# Patient Record
Sex: MALE | Race: WHITE | NOT HISPANIC OR LATINO
[De-identification: names, ages, dates, MRNs, and addresses within clinical notes are randomized per-mention and may not be internally consistent; named-entity substitution may affect disease eponyms.]

---

## 2020-01-13 PROBLEM — Z00.00 ENCOUNTER FOR PREVENTIVE HEALTH EXAMINATION: Status: ACTIVE | Noted: 2020-01-13

## 2020-01-23 ENCOUNTER — NON-APPOINTMENT (OUTPATIENT)
Age: 55
End: 2020-01-23

## 2020-01-23 ENCOUNTER — APPOINTMENT (OUTPATIENT)
Dept: CARDIOLOGY | Facility: CLINIC | Age: 55
End: 2020-01-23
Payer: COMMERCIAL

## 2020-01-23 VITALS
OXYGEN SATURATION: 95 % | SYSTOLIC BLOOD PRESSURE: 135 MMHG | DIASTOLIC BLOOD PRESSURE: 87 MMHG | HEIGHT: 75 IN | WEIGHT: 264 LBS | BODY MASS INDEX: 32.83 KG/M2 | HEART RATE: 89 BPM

## 2020-01-23 DIAGNOSIS — R07.9 CHEST PAIN, UNSPECIFIED: ICD-10-CM

## 2020-01-23 DIAGNOSIS — Z86.79 PERSONAL HISTORY OF OTHER DISEASES OF THE CIRCULATORY SYSTEM: ICD-10-CM

## 2020-01-23 DIAGNOSIS — Z83.438 FAMILY HISTORY OF OTHER DISORDER OF LIPOPROTEIN METABOLISM AND OTHER LIPIDEMIA: ICD-10-CM

## 2020-01-23 DIAGNOSIS — Z82.49 FAMILY HISTORY OF ISCHEMIC HEART DISEASE AND OTHER DISEASES OF THE CIRCULATORY SYSTEM: ICD-10-CM

## 2020-01-23 DIAGNOSIS — E66.3 OVERWEIGHT: ICD-10-CM

## 2020-01-23 PROCEDURE — 93000 ELECTROCARDIOGRAM COMPLETE: CPT

## 2020-01-23 PROCEDURE — 99204 OFFICE O/P NEW MOD 45 MIN: CPT

## 2020-01-23 RX ORDER — ROSUVASTATIN CALCIUM 40 MG/1
40 TABLET, FILM COATED ORAL
Refills: 0 | Status: DISCONTINUED | COMMUNITY
End: 2020-01-23

## 2020-01-27 LAB
ALBUMIN SERPL ELPH-MCNC: 4.7 G/DL
ALP BLD-CCNC: 60 U/L
ALT SERPL-CCNC: 42 U/L
ANION GAP SERPL CALC-SCNC: 11 MMOL/L
AST SERPL-CCNC: 21 U/L
BILIRUB SERPL-MCNC: 0.2 MG/DL
BUN SERPL-MCNC: 18 MG/DL
CALCIUM SERPL-MCNC: 9.8 MG/DL
CHLORIDE SERPL-SCNC: 105 MMOL/L
CHOLEST SERPL-MCNC: 203 MG/DL
CHOLEST/HDLC SERPL: 3.3 RATIO
CO2 SERPL-SCNC: 25 MMOL/L
CREAT SERPL-MCNC: 1.05 MG/DL
ESTIMATED AVERAGE GLUCOSE: 120 MG/DL
GLUCOSE SERPL-MCNC: 114 MG/DL
HBA1C MFR BLD HPLC: 5.8 %
HDLC SERPL-MCNC: 61 MG/DL
LDLC SERPL CALC-MCNC: 91 MG/DL
POTASSIUM SERPL-SCNC: 4.7 MMOL/L
PROT SERPL-MCNC: 7 G/DL
SODIUM SERPL-SCNC: 140 MMOL/L
TRIGL SERPL-MCNC: 257 MG/DL
TSH SERPL-ACNC: 0.89 UIU/ML

## 2020-05-13 ENCOUNTER — MESSAGE (OUTPATIENT)
Age: 55
End: 2020-05-13

## 2020-05-13 ENCOUNTER — APPOINTMENT (OUTPATIENT)
Dept: DISASTER EMERGENCY | Facility: HOSPITAL | Age: 55
End: 2020-05-13

## 2020-05-14 LAB
SARS-COV-2 IGG SERPL IA-ACNC: 4.3 INDEX
SARS-COV-2 IGG SERPL QL IA: POSITIVE

## 2021-04-02 ENCOUNTER — APPOINTMENT (OUTPATIENT)
Dept: CARDIOLOGY | Facility: CLINIC | Age: 56
End: 2021-04-02
Payer: COMMERCIAL

## 2021-04-02 ENCOUNTER — NON-APPOINTMENT (OUTPATIENT)
Age: 56
End: 2021-04-02

## 2021-04-02 VITALS
BODY MASS INDEX: 33.07 KG/M2 | DIASTOLIC BLOOD PRESSURE: 96 MMHG | OXYGEN SATURATION: 95 % | HEART RATE: 66 BPM | SYSTOLIC BLOOD PRESSURE: 150 MMHG | HEIGHT: 75 IN | WEIGHT: 266 LBS

## 2021-04-02 VITALS — SYSTOLIC BLOOD PRESSURE: 138 MMHG | DIASTOLIC BLOOD PRESSURE: 80 MMHG

## 2021-04-02 DIAGNOSIS — E78.5 HYPERLIPIDEMIA, UNSPECIFIED: ICD-10-CM

## 2021-04-02 DIAGNOSIS — I25.10 ATHEROSCLEROTIC HEART DISEASE OF NATIVE CORONARY ARTERY W/OUT ANGINA PECTORIS: ICD-10-CM

## 2021-04-02 PROCEDURE — 99214 OFFICE O/P EST MOD 30 MIN: CPT

## 2021-04-02 PROCEDURE — 99072 ADDL SUPL MATRL&STAF TM PHE: CPT

## 2021-04-02 PROCEDURE — 93000 ELECTROCARDIOGRAM COMPLETE: CPT

## 2021-04-02 RX ORDER — ATORVASTATIN CALCIUM 10 MG/1
10 TABLET, FILM COATED ORAL DAILY
Qty: 90 | Refills: 3 | Status: ACTIVE | COMMUNITY
Start: 2021-04-02 | End: 1900-01-01

## 2021-04-02 RX ORDER — ATORVASTATIN CALCIUM 10 MG/1
10 TABLET, FILM COATED ORAL DAILY
Qty: 90 | Refills: 3 | Status: DISCONTINUED | COMMUNITY
Start: 2020-02-13 | End: 2021-04-02

## 2021-05-04 ENCOUNTER — APPOINTMENT (OUTPATIENT)
Dept: CARDIOLOGY | Facility: CLINIC | Age: 56
End: 2021-05-04

## 2021-05-06 ENCOUNTER — APPOINTMENT (OUTPATIENT)
Dept: CARDIOLOGY | Facility: CLINIC | Age: 56
End: 2021-05-06
Payer: COMMERCIAL

## 2021-05-06 PROCEDURE — 93015 CV STRESS TEST SUPVJ I&R: CPT

## 2021-05-06 PROCEDURE — 99072 ADDL SUPL MATRL&STAF TM PHE: CPT

## 2021-05-07 ENCOUNTER — APPOINTMENT (OUTPATIENT)
Dept: CARDIOLOGY | Facility: CLINIC | Age: 56
End: 2021-05-07
Payer: COMMERCIAL

## 2021-05-07 PROCEDURE — 99072 ADDL SUPL MATRL&STAF TM PHE: CPT

## 2021-05-07 PROCEDURE — 93306 TTE W/DOPPLER COMPLETE: CPT

## 2021-05-17 DIAGNOSIS — I65.29 OCCLUSION AND STENOSIS OF UNSPECIFIED CAROTID ARTERY: ICD-10-CM

## 2021-05-20 ENCOUNTER — APPOINTMENT (OUTPATIENT)
Dept: CARDIOLOGY | Facility: CLINIC | Age: 56
End: 2021-05-20

## 2021-12-12 ENCOUNTER — TRANSCRIPTION ENCOUNTER (OUTPATIENT)
Age: 56
End: 2021-12-12

## 2023-05-15 ENCOUNTER — APPOINTMENT (OUTPATIENT)
Dept: OTOLARYNGOLOGY | Facility: CLINIC | Age: 58
End: 2023-05-15
Payer: COMMERCIAL

## 2023-05-15 ENCOUNTER — NON-APPOINTMENT (OUTPATIENT)
Age: 58
End: 2023-05-15

## 2023-05-15 VITALS
WEIGHT: 215 LBS | BODY MASS INDEX: 26.73 KG/M2 | DIASTOLIC BLOOD PRESSURE: 91 MMHG | HEIGHT: 75 IN | HEART RATE: 68 BPM | SYSTOLIC BLOOD PRESSURE: 146 MMHG

## 2023-05-15 PROCEDURE — 99244 OFF/OP CNSLTJ NEW/EST MOD 40: CPT | Mod: 25

## 2023-05-15 PROCEDURE — 92557 COMPREHENSIVE HEARING TEST: CPT

## 2023-05-15 PROCEDURE — 92504 EAR MICROSCOPY EXAMINATION: CPT

## 2023-05-15 PROCEDURE — 92567 TYMPANOMETRY: CPT

## 2023-05-15 NOTE — REVIEW OF SYSTEMS
[Ear Pain] : ear pain [Ear Itch] : ear itch [Hearing Loss] : hearing loss [Dizziness] : dizziness [Vertigo] : vertigo [Ear Noises] : ear noises [Lightheadedness] : lightheadedness [Eye Pain] : eye pain [Negative] : Heme/Lymph [FreeTextEntry1] : headaches, fatigue, daytime sleepiness, muscle aches

## 2023-05-15 NOTE — CONSULT LETTER
[FreeTextEntry2] : Michi Shane MD [FreeTextEntry1] : Dear Michi,\par \par I had the opportunity to see Aguila Her for evaluation of his disequilibrium and vertigo today.  His current symptomatology is not clearly explained by a single diagnosis.  His audiogram today was normal, while the audiogram from your office had shown an asymmetric left hearing loss with positive Stinger test.  Prior caloric testing was normal, and he was found to have up beating nystagmus on Elisabeth-Hallpike during VNG, suggestive of central etiology.  Prior MRI was normal.\par \par I am most suspicious for persistent perceptual postural dizziness as the cause for his constant disequilibrium symptoms.  A less likely diagnosis would be left Ménière's disease, although we would have to see another audiogram showing asymmetric hearing loss with good reliability prior to considering this diagnosis.  I offered him a trial of Lexapro for his disequilibrium, and also plan to obtain an ECOG and recommended maintenance of low-salt diet in the short-term.  We will also recheck his hearing at his next visit to assess for hearing fluctuations.\par \par I appreciate the opportunity to participate in your patient's care.\par \par Best regards,\par \par Oseas Cotto MD\par Otology/Neurotology\par [facility]\par [facility]

## 2023-05-15 NOTE — ASSESSMENT
[FreeTextEntry1] : Otoscopic exam today shows intact bilateral tympanic membranes without effusion or retraction.  Pioneer-Hallpike and supine roll maneuvers are negative for nystagmus, and he has no head impulse nystagmus.\par \par His current symptomatology is not clearly explained by a single diagnosis.  His audiogram today was normal, which I personally ordered and reviewed for his abnormal auditory perception.  The audiogram from an outside office had shown an asymmetric left hearing loss with positive Stinger test.  Prior caloric testing was normal, and he was found to have up beating nystagmus on Pioneer-Hallpike during VNG, suggestive of central etiology.  Prior MRI showed normal temporal bones and internal auditory canals bilaterally, the images of which which I personally reviewed and interpreted\par \par I am most suspicious for persistent perceptual postural dizziness as the cause for his constant disequilibrium symptoms.  A less likely diagnosis would be left Ménière's disease, although we would have to see another audiogram showing asymmetric hearing loss with good reliability prior to considering this diagnosis.  I offered him a trial of Lexapro for his disequilibrium, and also plan to obtain an ECOG and recommended maintenance of low-salt diet in the short-term.  We will also recheck his hearing at his next visit to assess for hearing fluctuations.  We briefly discussed prism glasses as an option for convergence insufficiency; he was previously seen an ophthalmologist who informed him that there was no primary ophthalmologic cause for his symptoms.  He does not have a history of migraine, denies significant recent life stressors, and has had no head trauma.

## 2023-05-15 NOTE — HISTORY OF PRESENT ILLNESS
[de-identified] : 58 y/o M pt of Dr. 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 noticed increased brain fog.\par hx of bells palsy. since being bit he has been experiencing dizziness and nausea constantly throughout the day, worst when driving. dizziness worst in shower or when stressed (for example news of recent passing of his friend caused dizziness episode) vertigo episodes occurring 2-3 times a week. increased anxiety. has not started VRT, high dose prednisone taper helped slightly. Caloric testing done. reports fluctuating hearing loss bilaterally worst in left ear. Left sided intermittent tinnitus that he describes as a high pitch beeping sound; turns on tv/music in order to sleep. Left ear fullness. Difficulty focusing eyes, driving at night is difficult. denies otalgia, otorrhea, recent ear infections, or headaches related to hearing.

## 2023-06-26 ENCOUNTER — APPOINTMENT (OUTPATIENT)
Dept: OTOLARYNGOLOGY | Facility: CLINIC | Age: 58
End: 2023-06-26
Payer: COMMERCIAL

## 2023-06-26 VITALS
BODY MASS INDEX: 15.67 KG/M2 | HEIGHT: 75 IN | DIASTOLIC BLOOD PRESSURE: 82 MMHG | WEIGHT: 126 LBS | SYSTOLIC BLOOD PRESSURE: 134 MMHG | HEART RATE: 83 BPM

## 2023-06-26 DIAGNOSIS — R42 DIZZINESS AND GIDDINESS: ICD-10-CM

## 2023-06-26 DIAGNOSIS — Z86.19 PERSONAL HISTORY OF OTHER INFECTIOUS AND PARASITIC DISEASES: ICD-10-CM

## 2023-06-26 DIAGNOSIS — H93.293 OTHER ABNORMAL AUDITORY PERCEPTIONS, BILATERAL: ICD-10-CM

## 2023-06-26 DIAGNOSIS — H90.5 UNSPECIFIED SENSORINEURAL HEARING LOSS: ICD-10-CM

## 2023-06-26 PROCEDURE — 92584 ELECTROCOCHLEOGRAPHY: CPT

## 2023-06-26 PROCEDURE — 92557 COMPREHENSIVE HEARING TEST: CPT

## 2023-06-26 PROCEDURE — 92567 TYMPANOMETRY: CPT

## 2023-06-26 PROCEDURE — 99214 OFFICE O/P EST MOD 30 MIN: CPT | Mod: 25

## 2023-06-26 RX ORDER — ESCITALOPRAM OXALATE 5 MG/1
5 TABLET ORAL
Qty: 14 | Refills: 0 | Status: ACTIVE | COMMUNITY
Start: 2023-05-15 | End: 1900-01-01

## 2023-06-26 NOTE — ASSESSMENT
[FreeTextEntry1] : Otoscopic exam today shows intact bilateral tympanic membranes without effusion or retraction, and bilateral patient function is normal.  I personally reviewed and interpreted prior ECOG testing, which was normal.  I personally reviewed and interpreted new audiogram today, which shows symmetric hearing bilaterally in the borderline normal to mild hearing loss range, with type A tympanograms bilaterally.  Thresholds appear similar to the audiogram performed in our office approximately 1 month ago.\par \par Because of absence of symptom improvement with Lexapro as well as side effects such as weight gain, recommended discontinuation of Lexapro, will begin weaning from 10 mg to 5 mg over the next 2 weeks, and then discontinue entirely after 2 weeks.  We will plan to obtain MRA of head and neck to confirm no neurovascular cause for dizziness.  Also recommended neuro optometry evaluation to evaluate for possible prism glasses.  Lastly, given the temporal association of Lyme disease treatment and the patient's onset of symptoms, recommended follow-up with an ID specialist for consideration of additional laboratory testing for latent Lyme disease.
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
## 2023-07-24 ENCOUNTER — NON-APPOINTMENT (OUTPATIENT)
Age: 58
End: 2023-07-24

## 2023-07-28 ENCOUNTER — OUTPATIENT (OUTPATIENT)
Dept: OUTPATIENT SERVICES | Facility: HOSPITAL | Age: 58
LOS: 1 days | End: 2023-07-28
Payer: COMMERCIAL

## 2023-07-28 DIAGNOSIS — A69.20 LYME DISEASE, UNSPECIFIED: ICD-10-CM

## 2023-07-28 DIAGNOSIS — D89.839 CYTOKINE RELEASE SYNDROME, GRADE UNSPECIFIED: ICD-10-CM

## 2023-07-28 DIAGNOSIS — E34.9 ENDOCRINE DISORDER, UNSPECIFIED: ICD-10-CM
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
## 2023-07-28 DIAGNOSIS — I10 ESSENTIAL (PRIMARY) HYPERTENSION: ICD-10-CM

## 2023-07-28 DIAGNOSIS — Z90.49 ACQUIRED ABSENCE OF OTHER SPECIFIED PARTS OF DIGESTIVE TRACT: Chronic | ICD-10-CM

## 2023-07-28 DIAGNOSIS — H53.8 OTHER VISUAL DISTURBANCES: ICD-10-CM

## 2023-07-28 DIAGNOSIS — Z86.16 PERSONAL HISTORY OF COVID-19: ICD-10-CM

## 2023-07-28 DIAGNOSIS — R42 DIZZINESS AND GIDDINESS: ICD-10-CM

## 2023-07-28 DIAGNOSIS — R51.0 HEADACHE WITH ORTHOSTATIC COMPONENT, NOT ELSEWHERE CLASSIFIED: ICD-10-CM

## 2023-07-28 DIAGNOSIS — R79.0 ABNORMAL LEVEL OF BLOOD MINERAL: ICD-10-CM

## 2023-07-28 PROCEDURE — 84479 ASSAY OF THYROID (T3 OR T4): CPT

## 2023-07-28 PROCEDURE — 82672 ASSAY OF ESTROGEN: CPT

## 2023-07-28 PROCEDURE — 82784 ASSAY IGA/IGD/IGG/IGM EACH: CPT

## 2023-07-28 PROCEDURE — 82746 ASSAY OF FOLIC ACID SERUM: CPT

## 2023-07-28 PROCEDURE — 83550 IRON BINDING TEST: CPT

## 2023-07-28 PROCEDURE — 82180 ASSAY OF ASCORBIC ACID: CPT

## 2023-07-28 PROCEDURE — 86663 EPSTEIN-BARR ANTIBODY: CPT

## 2023-07-28 PROCEDURE — 86664 EPSTEIN-BARR NUCLEAR ANTIGEN: CPT

## 2023-07-28 PROCEDURE — 86038 ANTINUCLEAR ANTIBODIES: CPT

## 2023-07-28 PROCEDURE — 86235 NUCLEAR ANTIGEN ANTIBODY: CPT

## 2023-07-28 PROCEDURE — 86618 LYME DISEASE ANTIBODY: CPT

## 2023-07-28 PROCEDURE — 84481 FREE ASSAY (FT-3): CPT

## 2023-07-28 PROCEDURE — 86800 THYROGLOBULIN ANTIBODY: CPT

## 2023-07-28 PROCEDURE — 82728 ASSAY OF FERRITIN: CPT

## 2023-07-28 PROCEDURE — 86140 C-REACTIVE PROTEIN: CPT

## 2023-07-28 PROCEDURE — 84439 ASSAY OF FREE THYROXINE: CPT

## 2023-07-28 PROCEDURE — 83540 ASSAY OF IRON: CPT

## 2023-07-28 PROCEDURE — 86431 RHEUMATOID FACTOR QUANT: CPT

## 2023-07-28 PROCEDURE — 83520 IMMUNOASSAY QUANT NOS NONAB: CPT

## 2023-07-28 PROCEDURE — 84443 ASSAY THYROID STIM HORMONE: CPT

## 2023-07-28 PROCEDURE — 82397 CHEMILUMINESCENT ASSAY: CPT

## 2023-07-28 PROCEDURE — 86769 SARS-COV-2 COVID-19 ANTIBODY: CPT

## 2023-07-28 PROCEDURE — 86665 EPSTEIN-BARR CAPSID VCA: CPT

## 2023-07-28 PROCEDURE — 86753 PROTOZOA ANTIBODY NOS: CPT

## 2023-07-28 PROCEDURE — 85379 FIBRIN DEGRADATION QUANT: CPT

## 2023-07-28 PROCEDURE — 80048 BASIC METABOLIC PNL TOTAL CA: CPT

## 2023-07-28 PROCEDURE — 84403 ASSAY OF TOTAL TESTOSTERONE: CPT

## 2023-07-28 PROCEDURE — 86255 FLUORESCENT ANTIBODY SCREEN: CPT

## 2023-07-28 PROCEDURE — 83036 HEMOGLOBIN GLYCOSYLATED A1C: CPT

## 2023-07-28 PROCEDURE — 80076 HEPATIC FUNCTION PANEL: CPT

## 2023-07-28 PROCEDURE — 36415 COLL VENOUS BLD VENIPUNCTURE: CPT

## 2023-07-28 PROCEDURE — 83525 ASSAY OF INSULIN: CPT

## 2023-07-28 PROCEDURE — 82670 ASSAY OF TOTAL ESTRADIOL: CPT

## 2023-07-28 PROCEDURE — 80061 LIPID PANEL: CPT

## 2023-07-28 PROCEDURE — 82607 VITAMIN B-12: CPT

## 2023-07-28 PROCEDURE — 82306 VITAMIN D 25 HYDROXY: CPT

## 2023-07-28 PROCEDURE — 87478 BORRELIA MIYAMOTOI AMP PRB: CPT

## 2023-07-28 PROCEDURE — 84153 ASSAY OF PSA TOTAL: CPT

## 2023-07-28 PROCEDURE — 85027 COMPLETE CBC AUTOMATED: CPT

## 2023-07-28 PROCEDURE — 86666 EHRLICHIA ANTIBODY: CPT

## 2023-08-01 ENCOUNTER — TRANSCRIPTION ENCOUNTER (OUTPATIENT)
Age: 58
End: 2023-08-01